# Patient Record
Sex: FEMALE | Race: WHITE | Employment: UNEMPLOYED | URBAN - METROPOLITAN AREA
[De-identification: names, ages, dates, MRNs, and addresses within clinical notes are randomized per-mention and may not be internally consistent; named-entity substitution may affect disease eponyms.]

---

## 2021-05-16 ENCOUNTER — APPOINTMENT (OUTPATIENT)
Dept: GENERAL RADIOLOGY | Age: 56
End: 2021-05-16
Payer: MEDICAID

## 2021-05-16 ENCOUNTER — APPOINTMENT (OUTPATIENT)
Dept: CT IMAGING | Age: 56
End: 2021-05-16
Payer: MEDICAID

## 2021-05-16 ENCOUNTER — HOSPITAL ENCOUNTER (EMERGENCY)
Age: 56
Discharge: HOME | End: 2021-05-16
Payer: MEDICAID

## 2021-05-16 VITALS
HEIGHT: 64 IN | BODY MASS INDEX: 27.31 KG/M2 | TEMPERATURE: 98.6 F | WEIGHT: 160 LBS | DIASTOLIC BLOOD PRESSURE: 74 MMHG | SYSTOLIC BLOOD PRESSURE: 99 MMHG | RESPIRATION RATE: 14 BRPM | HEART RATE: 98 BPM | OXYGEN SATURATION: 97 %

## 2021-05-16 DIAGNOSIS — G40.919 BREAKTHROUGH SEIZURE (HCC): Primary | ICD-10-CM

## 2021-05-16 LAB
ALBUMIN SERPL-MCNC: 4.5 GM/DL (ref 3.4–5)
ALCOHOL SCREEN SERUM: <0.01 %WT/VOL
ALP BLD-CCNC: 110 IU/L (ref 40–129)
ALT SERPL-CCNC: 12 U/L (ref 10–40)
AMPHETAMINES: NEGATIVE
ANION GAP SERPL CALCULATED.3IONS-SCNC: 9 MMOL/L (ref 4–16)
AST SERPL-CCNC: 16 IU/L (ref 15–37)
BACTERIA: ABNORMAL /HPF
BARBITURATE SCREEN URINE: NEGATIVE
BASOPHILS ABSOLUTE: 0 K/CU MM
BASOPHILS RELATIVE PERCENT: 0.3 % (ref 0–1)
BENZODIAZEPINE SCREEN, URINE: NEGATIVE
BILIRUB SERPL-MCNC: 0.4 MG/DL (ref 0–1)
BILIRUBIN URINE: NEGATIVE MG/DL
BLOOD, URINE: NEGATIVE
BUN BLDV-MCNC: 9 MG/DL (ref 6–23)
CALCIUM SERPL-MCNC: 10.1 MG/DL (ref 8.3–10.6)
CANNABINOID SCREEN URINE: NEGATIVE
CHLORIDE BLD-SCNC: 97 MMOL/L (ref 99–110)
CHP ED QC CHECK: YES
CLARITY: CLEAR
CO2: 29 MMOL/L (ref 21–32)
COCAINE METABOLITE: NEGATIVE
COLOR: YELLOW
CREAT SERPL-MCNC: 0.6 MG/DL (ref 0.6–1.1)
DIFFERENTIAL TYPE: ABNORMAL
EKG ATRIAL RATE: 126 BPM
EKG DIAGNOSIS: NORMAL
EKG P AXIS: 41 DEGREES
EKG P-R INTERVAL: 144 MS
EKG Q-T INTERVAL: 314 MS
EKG QRS DURATION: 76 MS
EKG QTC CALCULATION (BAZETT): 454 MS
EKG R AXIS: 21 DEGREES
EKG T AXIS: 59 DEGREES
EKG VENTRICULAR RATE: 126 BPM
EOSINOPHILS ABSOLUTE: 0.2 K/CU MM
EOSINOPHILS RELATIVE PERCENT: 2.3 % (ref 0–3)
GFR AFRICAN AMERICAN: >60 ML/MIN/1.73M2
GFR NON-AFRICAN AMERICAN: >60 ML/MIN/1.73M2
GLUCOSE BLD-MCNC: 102 MG/DL
GLUCOSE BLD-MCNC: 102 MG/DL (ref 70–99)
GLUCOSE, URINE: NEGATIVE MG/DL
HCT VFR BLD CALC: 41.2 % (ref 37–47)
HEMOGLOBIN: 13.4 GM/DL (ref 12.5–16)
IMMATURE NEUTROPHIL %: 0.2 % (ref 0–0.43)
KETONES, URINE: NEGATIVE MG/DL
LACTATE: 2.2 MMOL/L (ref 0.4–2)
LEUKOCYTE ESTERASE, URINE: ABNORMAL
LYMPHOCYTES ABSOLUTE: 1.6 K/CU MM
LYMPHOCYTES RELATIVE PERCENT: 17.7 % (ref 24–44)
MCH RBC QN AUTO: 27.7 PG (ref 27–31)
MCHC RBC AUTO-ENTMCNC: 32.5 % (ref 32–36)
MCV RBC AUTO: 85.1 FL (ref 78–100)
MONOCYTES ABSOLUTE: 0.4 K/CU MM
MONOCYTES RELATIVE PERCENT: 4.9 % (ref 0–4)
MUCUS: ABNORMAL HPF
NITRITE URINE, QUANTITATIVE: NEGATIVE
NUCLEATED RBC %: 0 %
OPIATES, URINE: NEGATIVE
OXYCODONE: ABNORMAL
PDW BLD-RTO: 13.1 % (ref 11.7–14.9)
PH, URINE: 7 (ref 5–8)
PHENCYCLIDINE, URINE: NEGATIVE
PLATELET # BLD: 213 K/CU MM (ref 140–440)
PMV BLD AUTO: 9.3 FL (ref 7.5–11.1)
POTASSIUM SERPL-SCNC: 4.1 MMOL/L (ref 3.5–5.1)
PROTEIN UA: NEGATIVE MG/DL
RBC # BLD: 4.84 M/CU MM (ref 4.2–5.4)
RBC URINE: 1 /HPF (ref 0–6)
SEGMENTED NEUTROPHILS ABSOLUTE COUNT: 6.7 K/CU MM
SEGMENTED NEUTROPHILS RELATIVE PERCENT: 74.6 % (ref 36–66)
SODIUM BLD-SCNC: 135 MMOL/L (ref 135–145)
SPECIFIC GRAVITY UA: 1.01 (ref 1–1.03)
SQUAMOUS EPITHELIAL: 1 /HPF
TOTAL IMMATURE NEUTOROPHIL: 0.02 K/CU MM
TOTAL NUCLEATED RBC: 0 K/CU MM
TOTAL PROTEIN: 7.4 GM/DL (ref 6.4–8.2)
TRANSITIONAL EPITHELIAL: <1 /HPF
TRICHOMONAS: ABNORMAL /HPF
TROPONIN T: <0.01 NG/ML
UROBILINOGEN, URINE: NEGATIVE MG/DL (ref 0.2–1)
WBC # BLD: 9 K/CU MM (ref 4–10.5)
WBC UA: 4 /HPF (ref 0–5)

## 2021-05-16 PROCEDURE — 80053 COMPREHEN METABOLIC PANEL: CPT

## 2021-05-16 PROCEDURE — 83605 ASSAY OF LACTIC ACID: CPT

## 2021-05-16 PROCEDURE — 93005 ELECTROCARDIOGRAM TRACING: CPT | Performed by: PHYSICIAN ASSISTANT

## 2021-05-16 PROCEDURE — 2580000003 HC RX 258: Performed by: EMERGENCY MEDICINE

## 2021-05-16 PROCEDURE — 96375 TX/PRO/DX INJ NEW DRUG ADDON: CPT

## 2021-05-16 PROCEDURE — 6360000002 HC RX W HCPCS: Performed by: EMERGENCY MEDICINE

## 2021-05-16 PROCEDURE — 84484 ASSAY OF TROPONIN QUANT: CPT

## 2021-05-16 PROCEDURE — 80307 DRUG TEST PRSMV CHEM ANLYZR: CPT

## 2021-05-16 PROCEDURE — 70450 CT HEAD/BRAIN W/O DYE: CPT

## 2021-05-16 PROCEDURE — 71045 X-RAY EXAM CHEST 1 VIEW: CPT

## 2021-05-16 PROCEDURE — G0480 DRUG TEST DEF 1-7 CLASSES: HCPCS

## 2021-05-16 PROCEDURE — 6370000000 HC RX 637 (ALT 250 FOR IP): Performed by: PHYSICIAN ASSISTANT

## 2021-05-16 PROCEDURE — 93010 ELECTROCARDIOGRAM REPORT: CPT | Performed by: INTERNAL MEDICINE

## 2021-05-16 PROCEDURE — 85025 COMPLETE CBC W/AUTO DIFF WBC: CPT

## 2021-05-16 PROCEDURE — 96361 HYDRATE IV INFUSION ADD-ON: CPT

## 2021-05-16 PROCEDURE — 6360000002 HC RX W HCPCS: Performed by: PHYSICIAN ASSISTANT

## 2021-05-16 PROCEDURE — 96365 THER/PROPH/DIAG IV INF INIT: CPT

## 2021-05-16 PROCEDURE — 81001 URINALYSIS AUTO W/SCOPE: CPT

## 2021-05-16 PROCEDURE — 99284 EMERGENCY DEPT VISIT MOD MDM: CPT

## 2021-05-16 PROCEDURE — 2580000003 HC RX 258: Performed by: PHYSICIAN ASSISTANT

## 2021-05-16 RX ORDER — 0.9 % SODIUM CHLORIDE 0.9 %
500 INTRAVENOUS SOLUTION INTRAVENOUS ONCE
Status: COMPLETED | OUTPATIENT
Start: 2021-05-16 | End: 2021-05-16

## 2021-05-16 RX ORDER — LORAZEPAM 2 MG/ML
0.5 INJECTION INTRAMUSCULAR ONCE
Status: COMPLETED | OUTPATIENT
Start: 2021-05-16 | End: 2021-05-16

## 2021-05-16 RX ORDER — 0.9 % SODIUM CHLORIDE 0.9 %
1000 INTRAVENOUS SOLUTION INTRAVENOUS ONCE
Status: DISCONTINUED | OUTPATIENT
Start: 2021-05-16 | End: 2021-05-16 | Stop reason: HOSPADM

## 2021-05-16 RX ORDER — ACETAMINOPHEN 500 MG
1000 TABLET ORAL ONCE
Status: COMPLETED | OUTPATIENT
Start: 2021-05-16 | End: 2021-05-16

## 2021-05-16 RX ADMIN — LORAZEPAM 0.5 MG: 2 INJECTION INTRAMUSCULAR; INTRAVENOUS at 11:04

## 2021-05-16 RX ADMIN — LEVETIRACETAM 1500 MG: 100 INJECTION, SOLUTION INTRAVENOUS at 11:04

## 2021-05-16 RX ADMIN — ACETAMINOPHEN 1000 MG: 500 TABLET ORAL at 11:21

## 2021-05-16 RX ADMIN — SODIUM CHLORIDE 500 ML: 9 INJECTION, SOLUTION INTRAVENOUS at 13:11

## 2021-05-16 ASSESSMENT — PAIN SCALES - GENERAL: PAINLEVEL_OUTOF10: 8

## 2021-05-16 NOTE — ED NOTES
Bed: 02TR-02  Expected date: 5/16/21  Expected time: 10:18 AM  Means of arrival: Triny Cardoso Twviolette  Comments:  50 F post ictal     Etian Wilburn RN  05/16/21 1025

## 2021-05-16 NOTE — ED NOTES
Discharge instructions reviewed with patient and family. follow up was discussed.  Patient and family denies further questions and verbalizes understanding     Kanun Seen, RN  05/16/21 9240
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
## 2021-05-22 ENCOUNTER — APPOINTMENT (OUTPATIENT)
Dept: CT IMAGING | Age: 56
DRG: 069 | End: 2021-05-22
Payer: MEDICAID

## 2021-05-22 ENCOUNTER — APPOINTMENT (OUTPATIENT)
Dept: GENERAL RADIOLOGY | Age: 56
DRG: 069 | End: 2021-05-22
Payer: MEDICAID

## 2021-05-22 ENCOUNTER — HOSPITAL ENCOUNTER (INPATIENT)
Age: 56
LOS: 2 days | Discharge: HOME | DRG: 069 | End: 2021-05-24
Attending: EMERGENCY MEDICINE | Admitting: INTERNAL MEDICINE
Payer: MEDICAID

## 2021-05-22 DIAGNOSIS — I63.9 CEREBROVASCULAR ACCIDENT (CVA), UNSPECIFIED MECHANISM (HCC): Primary | ICD-10-CM

## 2021-05-22 PROBLEM — R29.90 STROKE-LIKE SYMPTOMS: Status: ACTIVE | Noted: 2021-05-22

## 2021-05-22 LAB
ALBUMIN SERPL-MCNC: 4.2 GM/DL (ref 3.4–5)
ALCOHOL SCREEN SERUM: <0.01 %WT/VOL
ALP BLD-CCNC: 96 IU/L (ref 40–129)
ALT SERPL-CCNC: 10 U/L (ref 10–40)
ANION GAP SERPL CALCULATED.3IONS-SCNC: 9 MMOL/L (ref 4–16)
AST SERPL-CCNC: 13 IU/L (ref 15–37)
BASOPHILS ABSOLUTE: 0 K/CU MM
BASOPHILS RELATIVE PERCENT: 0.4 % (ref 0–1)
BILIRUB SERPL-MCNC: 0.3 MG/DL (ref 0–1)
BUN BLDV-MCNC: 9 MG/DL (ref 6–23)
CALCIUM SERPL-MCNC: 8.9 MG/DL (ref 8.3–10.6)
CHLORIDE BLD-SCNC: 97 MMOL/L (ref 99–110)
CO2: 27 MMOL/L (ref 21–32)
CREAT SERPL-MCNC: 0.7 MG/DL (ref 0.6–1.1)
DIFFERENTIAL TYPE: ABNORMAL
EOSINOPHILS ABSOLUTE: 0.1 K/CU MM
EOSINOPHILS RELATIVE PERCENT: 0.6 % (ref 0–3)
GFR AFRICAN AMERICAN: >60 ML/MIN/1.73M2
GFR NON-AFRICAN AMERICAN: >60 ML/MIN/1.73M2
GLUCOSE BLD-MCNC: 115 MG/DL (ref 70–99)
GLUCOSE BLD-MCNC: 116 MG/DL (ref 70–99)
HCT VFR BLD CALC: 34.4 % (ref 37–47)
HEMOGLOBIN: 11.6 GM/DL (ref 12.5–16)
IMMATURE NEUTROPHIL %: 0.4 % (ref 0–0.43)
INR BLD: 1.07 INDEX
LYMPHOCYTES ABSOLUTE: 0.9 K/CU MM
LYMPHOCYTES RELATIVE PERCENT: 12 % (ref 24–44)
MCH RBC QN AUTO: 28.6 PG (ref 27–31)
MCHC RBC AUTO-ENTMCNC: 33.7 % (ref 32–36)
MCV RBC AUTO: 84.9 FL (ref 78–100)
MONOCYTES ABSOLUTE: 0.4 K/CU MM
MONOCYTES RELATIVE PERCENT: 5.1 % (ref 0–4)
NUCLEATED RBC %: 0 %
PDW BLD-RTO: 13.4 % (ref 11.7–14.9)
PLATELET # BLD: 218 K/CU MM (ref 140–440)
PMV BLD AUTO: 9 FL (ref 7.5–11.1)
POTASSIUM SERPL-SCNC: 4.1 MMOL/L (ref 3.5–5.1)
PROTHROMBIN TIME: 12.9 SECONDS (ref 11.7–14.5)
RBC # BLD: 4.05 M/CU MM (ref 4.2–5.4)
SEGMENTED NEUTROPHILS ABSOLUTE COUNT: 6.3 K/CU MM
SEGMENTED NEUTROPHILS RELATIVE PERCENT: 81.5 % (ref 36–66)
SODIUM BLD-SCNC: 133 MMOL/L (ref 135–145)
TOTAL IMMATURE NEUTOROPHIL: 0.03 K/CU MM
TOTAL NUCLEATED RBC: 0 K/CU MM
TOTAL PROTEIN: 6.4 GM/DL (ref 6.4–8.2)
TROPONIN T: <0.01 NG/ML
WBC # BLD: 7.7 K/CU MM (ref 4–10.5)

## 2021-05-22 PROCEDURE — 70496 CT ANGIOGRAPHY HEAD: CPT

## 2021-05-22 PROCEDURE — 71045 X-RAY EXAM CHEST 1 VIEW: CPT

## 2021-05-22 PROCEDURE — 93005 ELECTROCARDIOGRAM TRACING: CPT | Performed by: EMERGENCY MEDICINE

## 2021-05-22 PROCEDURE — 70450 CT HEAD/BRAIN W/O DYE: CPT

## 2021-05-22 PROCEDURE — 82962 GLUCOSE BLOOD TEST: CPT

## 2021-05-22 PROCEDURE — 85610 PROTHROMBIN TIME: CPT

## 2021-05-22 PROCEDURE — 2140000000 HC CCU INTERMEDIATE R&B

## 2021-05-22 PROCEDURE — 84484 ASSAY OF TROPONIN QUANT: CPT

## 2021-05-22 PROCEDURE — G0480 DRUG TEST DEF 1-7 CLASSES: HCPCS

## 2021-05-22 PROCEDURE — 80053 COMPREHEN METABOLIC PANEL: CPT

## 2021-05-22 PROCEDURE — 6360000004 HC RX CONTRAST MEDICATION: Performed by: EMERGENCY MEDICINE

## 2021-05-22 PROCEDURE — 6370000000 HC RX 637 (ALT 250 FOR IP): Performed by: NURSE PRACTITIONER

## 2021-05-22 PROCEDURE — 85025 COMPLETE CBC W/AUTO DIFF WBC: CPT

## 2021-05-22 PROCEDURE — 99284 EMERGENCY DEPT VISIT MOD MDM: CPT

## 2021-05-22 PROCEDURE — 70498 CT ANGIOGRAPHY NECK: CPT

## 2021-05-22 PROCEDURE — 2580000003 HC RX 258: Performed by: NURSE PRACTITIONER

## 2021-05-22 PROCEDURE — 36415 COLL VENOUS BLD VENIPUNCTURE: CPT

## 2021-05-22 RX ORDER — ESOMEPRAZOLE MAGNESIUM 40 MG/1
40 FOR SUSPENSION ORAL DAILY
Status: DISCONTINUED | OUTPATIENT
Start: 2021-05-23 | End: 2021-05-22 | Stop reason: CLARIF

## 2021-05-22 RX ORDER — SODIUM CHLORIDE 0.9 % (FLUSH) 0.9 %
5-40 SYRINGE (ML) INJECTION EVERY 12 HOURS SCHEDULED
Status: DISCONTINUED | OUTPATIENT
Start: 2021-05-22 | End: 2021-05-24 | Stop reason: HOSPADM

## 2021-05-22 RX ORDER — CHOLECALCIFEROL (VITAMIN D3) 125 MCG
10 CAPSULE ORAL NIGHTLY PRN
Status: DISCONTINUED | OUTPATIENT
Start: 2021-05-22 | End: 2021-05-24 | Stop reason: HOSPADM

## 2021-05-22 RX ORDER — LEVETIRACETAM 500 MG/1
1500 TABLET ORAL 2 TIMES DAILY
Status: DISCONTINUED | OUTPATIENT
Start: 2021-05-22 | End: 2021-05-24 | Stop reason: HOSPADM

## 2021-05-22 RX ORDER — FLUOXETINE HYDROCHLORIDE 20 MG/1
20 CAPSULE ORAL DAILY
Status: DISCONTINUED | OUTPATIENT
Start: 2021-05-23 | End: 2021-05-24 | Stop reason: HOSPADM

## 2021-05-22 RX ORDER — POLYETHYLENE GLYCOL 3350 17 G/17G
17 POWDER, FOR SOLUTION ORAL DAILY PRN
Status: DISCONTINUED | OUTPATIENT
Start: 2021-05-22 | End: 2021-05-24 | Stop reason: HOSPADM

## 2021-05-22 RX ORDER — LEVETIRACETAM 750 MG/1
1500 TABLET ORAL 2 TIMES DAILY
COMMUNITY

## 2021-05-22 RX ORDER — ONDANSETRON 2 MG/ML
4 INJECTION INTRAMUSCULAR; INTRAVENOUS EVERY 6 HOURS PRN
Status: DISCONTINUED | OUTPATIENT
Start: 2021-05-22 | End: 2021-05-24 | Stop reason: HOSPADM

## 2021-05-22 RX ORDER — AMANTADINE HYDROCHLORIDE 100 MG/1
100 TABLET ORAL 2 TIMES DAILY
COMMUNITY

## 2021-05-22 RX ORDER — AMITRIPTYLINE HYDROCHLORIDE 25 MG/1
25 TABLET, FILM COATED ORAL NIGHTLY
COMMUNITY

## 2021-05-22 RX ORDER — ESOMEPRAZOLE MAGNESIUM 40 MG/1
40 FOR SUSPENSION ORAL DAILY
COMMUNITY

## 2021-05-22 RX ORDER — ROSUVASTATIN CALCIUM 40 MG/1
40 TABLET, COATED ORAL NIGHTLY
Status: DISCONTINUED | OUTPATIENT
Start: 2021-05-22 | End: 2021-05-24 | Stop reason: HOSPADM

## 2021-05-22 RX ORDER — LANOLIN ALCOHOL/MO/W.PET/CERES
10 CREAM (GRAM) TOPICAL NIGHTLY PRN
COMMUNITY

## 2021-05-22 RX ORDER — TAMSULOSIN HYDROCHLORIDE 0.4 MG/1
0.4 CAPSULE ORAL 2 TIMES DAILY
COMMUNITY

## 2021-05-22 RX ORDER — PROMETHAZINE HYDROCHLORIDE 25 MG/1
12.5 TABLET ORAL EVERY 6 HOURS PRN
Status: DISCONTINUED | OUTPATIENT
Start: 2021-05-22 | End: 2021-05-24 | Stop reason: HOSPADM

## 2021-05-22 RX ORDER — DICLOFENAC SODIUM 75 MG/1
75 TABLET, DELAYED RELEASE ORAL 2 TIMES DAILY
Status: ON HOLD | COMMUNITY
End: 2021-05-24 | Stop reason: HOSPADM

## 2021-05-22 RX ORDER — ASPIRIN 81 MG/1
81 TABLET ORAL DAILY
Status: CANCELLED | OUTPATIENT
Start: 2021-05-22

## 2021-05-22 RX ORDER — PANTOPRAZOLE SODIUM 40 MG/1
40 TABLET, DELAYED RELEASE ORAL
Status: DISCONTINUED | OUTPATIENT
Start: 2021-05-23 | End: 2021-05-24 | Stop reason: HOSPADM

## 2021-05-22 RX ORDER — SODIUM CHLORIDE 9 MG/ML
25 INJECTION, SOLUTION INTRAVENOUS PRN
Status: DISCONTINUED | OUTPATIENT
Start: 2021-05-22 | End: 2021-05-24 | Stop reason: HOSPADM

## 2021-05-22 RX ORDER — ASPIRIN 600 MG/1
300 SUPPOSITORY RECTAL DAILY
Status: CANCELLED | OUTPATIENT
Start: 2021-05-22

## 2021-05-22 RX ORDER — AMITRIPTYLINE HYDROCHLORIDE 25 MG/1
25 TABLET, FILM COATED ORAL NIGHTLY
Status: DISCONTINUED | OUTPATIENT
Start: 2021-05-22 | End: 2021-05-24 | Stop reason: HOSPADM

## 2021-05-22 RX ORDER — SODIUM CHLORIDE 0.9 % (FLUSH) 0.9 %
5-40 SYRINGE (ML) INJECTION PRN
Status: DISCONTINUED | OUTPATIENT
Start: 2021-05-22 | End: 2021-05-24 | Stop reason: HOSPADM

## 2021-05-22 RX ORDER — FLUOXETINE HYDROCHLORIDE 20 MG/1
20 CAPSULE ORAL DAILY
COMMUNITY

## 2021-05-22 RX ORDER — DONEPEZIL HYDROCHLORIDE 10 MG/1
10 TABLET, FILM COATED ORAL NIGHTLY
Status: DISCONTINUED | OUTPATIENT
Start: 2021-05-22 | End: 2021-05-24 | Stop reason: HOSPADM

## 2021-05-22 RX ORDER — TAMSULOSIN HYDROCHLORIDE 0.4 MG/1
0.4 CAPSULE ORAL 2 TIMES DAILY
Status: DISCONTINUED | OUTPATIENT
Start: 2021-05-22 | End: 2021-05-24 | Stop reason: HOSPADM

## 2021-05-22 RX ORDER — AMANTADINE HYDROCHLORIDE 100 MG/1
100 CAPSULE, GELATIN COATED ORAL 2 TIMES DAILY
Status: DISCONTINUED | OUTPATIENT
Start: 2021-05-22 | End: 2021-05-24 | Stop reason: HOSPADM

## 2021-05-22 RX ORDER — 0.9 % SODIUM CHLORIDE 0.9 %
10 VIAL (ML) INJECTION
Status: COMPLETED | OUTPATIENT
Start: 2021-05-22 | End: 2021-05-22

## 2021-05-22 RX ORDER — DONEPEZIL HYDROCHLORIDE 10 MG/1
10 TABLET, FILM COATED ORAL NIGHTLY
COMMUNITY

## 2021-05-22 RX ADMIN — AMANTADINE HYDROCHLORIDE 100 MG: 100 CAPSULE, LIQUID FILLED ORAL at 22:55

## 2021-05-22 RX ADMIN — IOPAMIDOL 75 ML: 755 INJECTION, SOLUTION INTRAVENOUS at 14:54

## 2021-05-22 RX ADMIN — ROSUVASTATIN 40 MG: 40 TABLET, FILM COATED ORAL at 22:55

## 2021-05-22 RX ADMIN — Medication 10 MG: at 22:55

## 2021-05-22 RX ADMIN — DONEPEZIL HYDROCHLORIDE 10 MG: 10 TABLET, FILM COATED ORAL at 22:56

## 2021-05-22 RX ADMIN — TAMSULOSIN HYDROCHLORIDE 0.4 MG: 0.4 CAPSULE ORAL at 22:55

## 2021-05-22 RX ADMIN — AMITRIPTYLINE HYDROCHLORIDE 25 MG: 25 TABLET, FILM COATED ORAL at 22:56

## 2021-05-22 RX ADMIN — Medication 10 ML: at 14:55

## 2021-05-22 RX ADMIN — LEVETIRACETAM 1500 MG: 500 TABLET, FILM COATED ORAL at 22:55

## 2021-05-22 RX ADMIN — SODIUM CHLORIDE, PRESERVATIVE FREE 10 ML: 5 INJECTION INTRAVENOUS at 22:56

## 2021-05-22 NOTE — ED PROVIDER NOTES
bowel sounds. Soft. Nontender. Non distended. Neurological:      Mental Status Exam:   Alert and oriented times three, follows commands, patient speech and language are not intact she is able to say 1 or 2 words and that everything else that comes out is gibberish and nonsensical    Cranial [id number] Intact.     Cranial nerve II  Visual acuity: normal  Cranial nerve III  Pupils: equal, round, reactive to light  Cranial nerves III, IV, VI  Extraocular Movements: intact  Cranial nerve V  Facial sensation: intact  Cranial nerve VII  Facial strength: intact  Cranial nerve VIII  Hearing: intact  Cranial nerve IX  Palate: intact  Cranial nerve XI  Shoulder shrug: intact  Cranial nerve XII  Tongue movement: normal    Motor:   Drift: absent  Motor exam is symmetrical 5 out of 5 all extremities bilaterally  Tone: normal  Abnormal Movements: Absent    Sensory:  Light Touch  Right Upper Extremity: normal  Left Upper Extremity: normal  Right Lower Extremity: normal  Left Lower Extremity: normal      I have reviewed and interpreted all of the currently available lab results from this visit (if applicable):  Results for orders placed or performed during the hospital encounter of 05/22/21   CBC Auto Differential   Result Value Ref Range    WBC 7.7 4.0 - 10.5 K/CU MM    RBC 4.05 (L) 4.2 - 5.4 M/CU MM    Hemoglobin 11.6 (L) 12.5 - 16.0 GM/DL    Hematocrit 34.4 (L) 37 - 47 %    MCV 84.9 78 - 100 FL    MCH 28.6 27 - 31 PG    MCHC 33.7 32.0 - 36.0 %    RDW 13.4 11.7 - 14.9 %    Platelets 030 302 - 461 K/CU MM    MPV 9.0 7.5 - 11.1 FL    Differential Type AUTOMATED DIFFERENTIAL     Segs Relative 81.5 (H) 36 - 66 %    Lymphocytes % 12.0 (L) 24 - 44 %    Monocytes % 5.1 (H) 0 - 4 %    Eosinophils % 0.6 0 - 3 %    Basophils % 0.4 0 - 1 %    Segs Absolute 6.3 K/CU MM    Lymphocytes Absolute 0.9 K/CU MM    Monocytes Absolute 0.4 K/CU MM    Eosinophils Absolute 0.1 K/CU MM    Basophils Absolute 0.0 K/CU MM    Nucleated RBC % 0.0 % Total Nucleated RBC 0.0 K/CU MM    Total Immature Neutrophil 0.03 K/CU MM    Immature Neutrophil % 0.4 0 - 0.43 %   Comprehensive Metabolic Panel w/ Reflex to MG   Result Value Ref Range    Sodium 133 (L) 135 - 145 MMOL/L    Potassium 4.1 3.5 - 5.1 MMOL/L    Chloride 97 (L) 99 - 110 mMol/L    CO2 27 21 - 32 MMOL/L    BUN 9 6 - 23 MG/DL    CREATININE 0.7 0.6 - 1.1 MG/DL    Glucose 115 (H) 70 - 99 MG/DL    Calcium 8.9 8.3 - 10.6 MG/DL    Albumin 4.2 3.4 - 5.0 GM/DL    Total Protein 6.4 6.4 - 8.2 GM/DL    Total Bilirubin 0.3 0.0 - 1.0 MG/DL    ALT 10 10 - 40 U/L    AST 13 (L) 15 - 37 IU/L    Alkaline Phosphatase 96 40 - 129 IU/L    GFR Non-African American >60 >60 mL/min/1.73m2    GFR African American >60 >60 mL/min/1.73m2    Anion Gap 9 4 - 16   Troponin   Result Value Ref Range    Troponin T <0.010 <0.01 NG/ML   Protime-INR   Result Value Ref Range    Protime 12.9 11.7 - 14.5 SECONDS    INR 1.07 INDEX   POCT Glucose   Result Value Ref Range    POC Glucose 116 (H) 70 - 99 MG/DL   EKG 12 Lead   Result Value Ref Range    Ventricular Rate 109 BPM    Atrial Rate 109 BPM    P-R Interval 144 ms    QRS Duration 66 ms    Q-T Interval 340 ms    QTc Calculation (Bazett) 457 ms    P Axis 48 degrees    R Axis 35 degrees    T Axis 49 degrees    Diagnosis       Sinus tachycardia  Low voltage QRS  Borderline ECG  When compared with ECG of 16-MAY-2021 11:05,  No significant change was found        Radiographs (if obtained):  Radiologist's Report Reviewed:  No results found.     EKG (if obtained): (All EKG's are interpreted by myself in the absence of a cardiologist)  EKG shows sinus rhythm rate of 109 normal DE and QRS intervals no ST elevation no old EKGs available for comparison  MDM:  Patient presenting for speech difficulty, stroke alert called, patient taken to CT, received a CTA, no large vessel occlusion, CT does show a subacute or old subdural hematoma, patient's dysarthria is slowly improving, lab results are nonemergent,

## 2021-05-22 NOTE — Clinical Note
Patient Class: Inpatient [101]   REQUIRED: Diagnosis: Stroke-like symptoms [837888]   Estimated Length of Stay: Estimated stay of more than 2 midnights   Admitting Provider: Leann Tadeo [[id number]]   Preferred Department: If not neuro floor, 4 N if available   Telemetry/Cardiac Monitoring Required?: Yes

## 2021-05-22 NOTE — ED NOTES
Bed: ED-32  Expected date:   Expected time:   Means of arrival:   Comments:  EMS- 54's F history of TBI     Michelle Maldonado  05/22/21 9763

## 2021-05-22 NOTE — H&P
History and Physical      Name:  Fern Murphy /Age/Sex: 1965  (50 y.o. female)   MRN & CSN:  [id number] & [id number] Admission Date/Time: 2021  2:41 PM   Location:  [place] [address] PCP: No primary care provider on file. Hospital Day: 1    Discussed patient and POC with Dr. Lee Sanchez and Plan:     Fern Murphy is a 50 y.o.  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): not a dissection but fenestration; present since at least   ED to consult vascular, Dr Marisela Archer re V2 dissection  Neurosurgery c/s by ED, Dr Sarah Mackenzie- will follow  CTA head/neck without acute process  Neuro exam non-focal  Neuro checks Q 4  Check Brain MRI without contrast  Consult to Neurology by ED  Checks A1c/Lipid  Holding ASA/NSAIDS given hx TBI, recent surgery, hx parenchymal bleeding-defer to NS on starting these      Chronic  - TBI w/ intracranial bleeding hx- 2020;  Tx at [address]; had bone flap replaced ~ 10 days ago  - Seizure hx- onset prior to TBI; continue keppra 1500 bid  - Headache hx- onset prior to TBI  - Depression- cont prozac  - GERD- cont ppi      Discussed patient with ED physician    Diet NPO until swallow study; then AAT to GEN   DVT Prophylaxis [] Lovenox, []  Heparin, [x] SCDs, [] Ambulation   GI Prophylaxis [] PPI,  [] H2 Blocker,  [] Carafate,  [] Diet/Tube Feeds   Code Status Full    Disposition Patient requires continued admission due to stroke w/u   MDM [] Low, [] Moderate,[x]  High  Patient's risk as above due to      [] One or more chronic illnesses with exacerbation progression      [x] Two or more stable chronic illnesses      [x] Undiagnosed new problem with uncertain conjunctivitis. HENT Mucous membranes are moist. Oral pharynx without exudates, no evidence of thrush. NECK Supple, no apparent thyromegaly or masses. RESP Clear to auscultation, no wheezes, rales or rhonchi. Symmetric chest movement while on room air. CARDIO/VASC S1/S2 auscultated. Regular rate without appreciable murmurs, rubs, or gallops. No JVD or carotid bruits. Peripheral pulses equal bilaterally and palpable. No peripheral edema. GI Abdomen is soft without significant tenderness, masses, or guarding. Bowel sounds are normoactive. Rectal exam deferred.  No costovertebral angle tenderness. Begum catheter is not present. HEME/LYMPH No palpable cervical lymphadenopathy and no hepatosplenomegaly. No petechiae or ecchymoses. MSK No gross joint deformities. Equal strength bilaterally to upper and lower extremities. 5/5 tested at delt/triceps/biceps/grasps/finger intrinsics/hip/knee/df/pf  SKIN Normal coloration, warm, dry. NEURO Cranial nerves appear grossly intact, normal speech, no lateralizing weakness. Sensation intact and equal bilaterally  PSYCH Awake, alert, oriented x 3    Past Medical History:        Past Medical History:   Diagnosis Date    Seizures ([facility] [address]        PSHX:  has a past surgical history that includes craniotomy. Allergies:    Allergies   Allergen Reactions    Codeine        FAM HX:denies known family history     Soc HX:   Social History     Socioeconomic History    Marital status: Single     Spouse name: Not on file    Number of children: Not on file    Years of education: Not on file    Highest education level: Not on file   Occupational History    Not on file   Tobacco Use    Smoking status: Never Smoker    Smokeless tobacco: Never Used   Substance and Sexual Activity    Alcohol use: Not Currently    Drug use: Never    Sexual activity: Not Currently   Other Topics Concern    Not on file   Social History Narrative    Not on file     Social Determinants of Health Financial Resource Strain:     Difficulty of Paying Living Expenses:    Food Insecurity:     Worried About Running Out of Food in the Last Year:     [address] in the Last Year:    Transportation Needs:     Lack of Transportation (Medical):  Lack of Transportation (Non-Medical):    Physical Activity:     Days of Exercise per Week:     Minutes of Exercise per Session:    Stress:     Feeling of Stress :    Social Connections:     Frequency of Communication with Friends and Family:     Frequency of Social Gatherings with Friends and Family:     Attends Hoah[employer] Services:     Active Member of Clubs or Organizations:     Attends Club or Organization Meetings:     Marital Status:    Intimate Partner Violence:     Fear of Current or Ex-Partner:     Emotionally Abused:     Physically Abused:     Sexually Abused:        Medications:     Medications:      Amantadine (SYMMETREL) 100 MG TABS tablet Take 100 mg by mouth 2 times daily Ileene Danmartin, APRN - NP Reordered   Ordered as: Amantadine (SYMMETREL) tablet 100 mg - 100 mg, Oral, 2 TIMES DAILY, First dose on Sat 5/22/21 at 2100   levETIRAcetam (KEPPRA) 750 MG tablet Take 1,500 mg by mouth 2 times daily Ileene Dance, APRN - NP Reordered   Ordered as: levETIRAcetam (KEPPRA) tablet 1,500 mg - 1,500 mg, Oral, 2 TIMES DAILY, First dose on Sat 5/22/21 at 2100 Do not crush or chew.     donepezil (ARICEPT) 10 MG tablet Take 10 mg by mouth nightly Ileene Dance, APRN - NP Reordered   Ordered as: donepezil (ARICEPT) tablet 10 mg - 10 mg, Oral, NIGHTLY, First dose on Sat 5/22/21 at 2100   FLUoxetine (PROZAC) 20 MG capsule Take 20 mg by mouth daily Ileene Dance, APRN - NP Reordered   Ordered as: FLUoxetine (PROZAC) capsule 20 mg - 20 mg, Oral, DAILY, First dose on Sun 5/23/21 at 0900   melatonin 3 MG TABS tablet Take 10 mg by mouth nightly as needed Ileene Dance, APRN - NP Reordered   Ordered as: melatonin tablet 10 mg - 10 mg, Oral, NIGHTLY PRN, Sleep, Starting on Sat 5/22/21 at 2100   esomeprazole Magnesium (NEXIUM) 40 MG PACK Take 40 mg by mouth daily ALFONSO Davenport NP Reordered   Ordered as: esomeprazole Magnesium (NEXIUM) 40 MG PACK 40 mg - 40 mg, Oral, DAILY, First dose on Sun 5/23/21 at 0900 Do not crush or break. tamsulosin (FLOMAX) 0.4 MG capsule Take 0.4 mg by mouth 2 times daily ALFONSO Davenport - NP Reordered   Ordered as: tamsulosin (FLOMAX) capsule 0.4 mg - 0.4 mg, Oral, 2 TIMES DAILY, First dose on Sat 5/22/21 at 2100 Do not crush or break. amitriptyline (ELAVIL) 25 MG tablet Take 25 mg by mouth nightly ALFONSO Davenport - NP Reordered   Ordered as: amitriptyline (ELAVIL) tablet 25 mg - 25 mg, Oral, NIGHTLY, First dose on Sat 5/22/21 at 2100   diclofenac (VOLTAREN) 75 MG EC tablet Take 75 mg by mouth 2 times daily ALFONSO Davenport - NP Not Ordered     Data:   CTA HEAD W CONTRAST [[id number]] Collected: 05/22/21 1514      Order Status: Completed Updated: 05/22/21 1535     Narrative:       EXAMINATION:   CTA OF THE NECK; CTA OF THE HEAD WITH CONTRAST 5/22/2021 2:53 pm; 5/22/2021   2:52 pm:     TECHNIQUE:   CTA of the neck was performed with the administration of intravenous   contrast. Multiplanar reformatted images are provided for review.  MIP images   are provided for review. Stenosis of the internal carotid arteries measured   using NASCET criteria. Dose modulation, iterative reconstruction, and/or   weight based adjustment of the mA/kV was utilized to reduce the radiation   dose to as low as reasonably achievable.; CTA of the head/brain was performed   with the administration of intravenous contrast. Multiplanar reformatted   images are provided for review.  MIP images are provided for review. Dose   modulation, iterative reconstruction, and/or weight based adjustment of the   mA/kV was utilized to reduce the radiation dose to as low as reasonably   achievable. COMPARISON:   None. HISTORY:   ORDERING SYSTEM PROVIDED HISTORY: dysarthria   TECHNOLOGIST PROVIDED HISTORY:   Has a \"code stroke\" or \"stroke alert\" been called? ->Yes   Reason for exam:->dysarthria   Decision Support Exception - unselect if not a suspected or confirmed   emergency medical condition->Emergency Medical Condition (MA)   Reason for Exam: dysarthria   Acuity: Acute   Type of Exam: Initial   Additional signs and symptoms: poorhistorian   Relevant Medical/Surgical History: unable to answer questions; ORDERING   SYSTEM PROVIDED HISTORY: dysarthria   TECHNOLOGIST PROVIDED HISTORY:   Has a \"code stroke\" or \"stroke alert\" been called? ->Yes   Reason for exam:->dysarthria   Decision Support Exception - unselect if not a suspected or confirmed   emergency medical condition->Emergency Medical Condition (MA)   Reason for Exam: dysarthria   Acuity: Acute   Type of Exam: Initial   Additional signs and symptoms: poor historian   Relevant Medical/Surgical History: 75ml isovue 370     FINDINGS:     CTA NECK:     AORTIC ARCH/ARCH VESSELS: No dissection or arterial injury.  No significant   stenosis of the brachiocephalic or subclavian arteries. CAROTID ARTERIES: No dissection, arterial injury, or hemodynamically   significant stenosis by NASCET criteria. VERTEBRAL ARTERIES: No dissection, arterial injury, or significant stenosis   in the right vertebral artery.  There is short-segment dissection of the left   vertebral artery V2 segment spanning the C5 vertebral body level.  No   associated stenosis. SOFT TISSUES: The lung apices are clear.  No cervical or superior mediastinal   lymphadenopathy.  The larynx and pharynx are unremarkable.  No acute   abnormality of the salivary and thyroid glands. BONES: Moderate degenerative disease at C6-C7. CTA HEAD:     ANTERIOR CIRCULATION: No significant stenosis of the intracranial internal   carotid, anterior cerebral, or middle cerebral arteries. No aneurysm.    Anterior communicating artery is present. POSTERIOR CIRCULATION: No significant stenosis of the vertebral, basilar, or   posterior cerebral arteries. No aneurysm. OTHER: No dural venous sinus thrombosis on this non-dedicated study. BRAIN: No mass effect or midline shift. No extra-axial fluid collection. The   gray-white differentiation is maintained.      Impression:       1. No hemodynamically significant stenosis or branch occlusion in the   cervical or intracranial circulation. 2. Short segment left vertebral artery V2 segment dissection spanning the C5   vertebral body level.  No associated stenosis. Findings were discussed with Carey Youssef at 3:32 pm on 5/22/2021.      CTA NECK W CONTRAST [[id number]] Collected: 05/22/21 1514     Order Status: Completed Updated: 05/22/21 1535     Narrative:       EXAMINATION:   CTA OF THE NECK; CTA OF THE HEAD WITH CONTRAST 5/22/2021 2:53 pm; 5/22/2021   2:52 pm:     TECHNIQUE:   CTA of the neck was performed with the administration of intravenous   contrast. Multiplanar reformatted images are provided for review.  MIP images   are provided for review. Stenosis of the internal carotid arteries measured   using NASCET criteria. Dose modulation, iterative reconstruction, and/or   weight based adjustment of the mA/kV was utilized to reduce the radiation   dose to as low as reasonably achievable.; CTA of the head/brain was performed   with the administration of intravenous contrast. Multiplanar reformatted   images are provided for review.  MIP images are provided for review. Dose   modulation, iterative reconstruction, and/or weight based adjustment of the   mA/kV was utilized to reduce the radiation dose to as low as reasonably   achievable. COMPARISON:   None. HISTORY:   ORDERING SYSTEM PROVIDED HISTORY: dysarthria   TECHNOLOGIST PROVIDED HISTORY:   Has a \"code stroke\" or \"stroke alert\" been called? ->Yes   Reason for exam:->dysarthria   Decision Support Exception - unselect if not a suspected or confirmed   emergency medical condition->Emergency Medical Condition (MA)   Reason for Exam: dysarthria   Acuity: Acute   Type of Exam: Initial   Additional signs and symptoms: poorhistorian   Relevant Medical/Surgical History: unable to answer questions; ORDERING   SYSTEM PROVIDED HISTORY: dysarthria   TECHNOLOGIST PROVIDED HISTORY:   Has a \"code stroke\" or \"stroke alert\" been called? ->Yes   Reason for exam:->dysarthria   Decision Support Exception - unselect if not a suspected or confirmed   emergency medical condition->Emergency Medical Condition (MA)   Reason for Exam: dysarthria   Acuity: Acute   Type of Exam: Initial   Additional signs and symptoms: poor historian   Relevant Medical/Surgical History: 75ml isovue 370     FINDINGS:     CTA NECK:     AORTIC ARCH/ARCH VESSELS: No dissection or arterial injury.  No significant   stenosis of the brachiocephalic or subclavian arteries. CAROTID ARTERIES: No dissection, arterial injury, or hemodynamically   significant stenosis by NASCET criteria. VERTEBRAL ARTERIES: No dissection, arterial injury, or significant stenosis   in the right vertebral artery.  There is short-segment dissection of the left   vertebral artery V2 segment spanning the C5 vertebral body level.  No   associated stenosis. SOFT TISSUES: The lung apices are clear.  No cervical or superior mediastinal   lymphadenopathy.  The larynx and pharynx are unremarkable.  No acute   abnormality of the salivary and thyroid glands. BONES: Moderate degenerative disease at C6-C7. CTA HEAD:     ANTERIOR CIRCULATION: No significant stenosis of the intracranial internal   carotid, anterior cerebral, or middle cerebral arteries. No aneurysm. Anterior communicating artery is present. POSTERIOR CIRCULATION: No significant stenosis of the vertebral, basilar, or   posterior cerebral arteries. No aneurysm.      OTHER: No dural venous sinus thrombosis on this non-dedicated study. BRAIN: No mass effect or midline shift. No extra-axial fluid collection. The   gray-white differentiation is maintained.      Impression:       1. No hemodynamically significant stenosis or branch occlusion in the   cervical or intracranial circulation. 2. Short segment left vertebral artery V2 segment dissection spanning the C5   vertebral body level.  No associated stenosis. Findings were discussed with Melanie Topete at 3:32 pm on 5/22/2021.      CT HEAD WO CONTRAST [[id number]] Collected: 05/22/21 1459     Order Status: Completed Updated: 05/22/21 1508     Narrative:       EXAMINATION:   CT OF THE HEAD WITHOUT CONTRAST  5/22/2021 2:52 pm     TECHNIQUE:   CT of the head was performed without the administration of intravenous   contrast. Dose modulation, iterative reconstruction, and/or weight based   adjustment of the mA/kV was utilized to reduce the radiation dose to as low   as reasonably achievable. COMPARISON:   05/16/2021     HISTORY:   ORDERING SYSTEM PROVIDED HISTORY: dysarthria   TECHNOLOGIST PROVIDED HISTORY:   Has a \"code stroke\" or \"stroke alert\" been called? ->Yes   Reason for exam:->dysarthria   Decision Support Exception - unselect if not a suspected or confirmed   emergency medical condition->Emergency Medical Condition (MA)   Is the patient pregnant?->No   Reason for Exam: dysarthria   Acuity: Acute   Type of Exam: Initial   Additional signs and symptoms: unknown   Relevant Medical/Surgical History: poor historian, hx of recent craniotomy,   still has staples in head     FINDINGS:   BRAIN/VENTRICLES: Low-density extra-axial collection overlying the left   cerebral convexity laterally, subjacent to the craniotomy flap, measures up   to 0.7 cm in thickness.  There is a small amount of gas in this collection.    There is mild mass effect on the subjacent brain parenchyma with sulcal   effacement, but no significant midline shift.  Redemonstration of low attenuation within the left parietal and occipital lobes, similar in   appearance to prior study.  No new intracranial hemorrhage.  No   hydrocephalus.  The basal cisterns are patent. ORBITS: The visualized portion of the orbits demonstrate no acute abnormality. SINUSES: The visualized paranasal sinuses and mastoid air cells demonstrate   no acute abnormality. SOFT TISSUES/SKULL:  Status post left frontoparietal craniotomy.  Decrease in   left-sided scalp swelling.  Skin staples still present.      Impression:       1. Stable low-density extra-axial collection overlying the left lateral   cerebral convexity measuring 0.7 cm in thickness.  This is presumed related   to recent craniotomy.  No new intracranial hemorrhage. 2. Again seen is left parietal lobe and occipital lobe low-attenuation   suggesting subacute infarctions or postoperative edema.    Findings were discussed with Manoj Blunt at 3:05 pm on 5/22/2021.      XR CHEST PORTABLE [[id number]]      Order Status: No result            Electronically signed by ALFONSO Coleman NP on 5/22/2021 at 3:45 PM

## 2021-05-22 NOTE — ED NOTES
Patient updated on plan of care. Resting in bed with no signs of distress. Remains on cardiac monitor. Resps even and unlabored. Denies any needs. Side rails up x 2.       Hayder Lake RN  05/22/21 9998

## 2021-05-23 LAB
CHOLESTEROL: 194 MG/DL
EKG ATRIAL RATE: 109 BPM
EKG DIAGNOSIS: NORMAL
EKG P AXIS: 48 DEGREES
EKG P-R INTERVAL: 144 MS
EKG Q-T INTERVAL: 340 MS
EKG QRS DURATION: 66 MS
EKG QTC CALCULATION (BAZETT): 457 MS
EKG R AXIS: 35 DEGREES
EKG T AXIS: 49 DEGREES
EKG VENTRICULAR RATE: 109 BPM
ESTIMATED AVERAGE GLUCOSE: 100 MG/DL
HBA1C MFR BLD: 5.1 % (ref 4.2–6.3)
HCT VFR BLD CALC: 40.3 % (ref 37–47)
HDLC SERPL-MCNC: 78 MG/DL
HEMOGLOBIN: 12.9 GM/DL (ref 12.5–16)
LDL CHOLESTEROL DIRECT: 113 MG/DL
MAGNESIUM: 2.1 MG/DL (ref 1.8–2.4)
MCH RBC QN AUTO: 27.3 PG (ref 27–31)
MCHC RBC AUTO-ENTMCNC: 32 % (ref 32–36)
MCV RBC AUTO: 85.4 FL (ref 78–100)
PDW BLD-RTO: 13.6 % (ref 11.7–14.9)
PLATELET # BLD: 246 K/CU MM (ref 140–440)
PMV BLD AUTO: 9.4 FL (ref 7.5–11.1)
RBC # BLD: 4.72 M/CU MM (ref 4.2–5.4)
TRIGL SERPL-MCNC: 88 MG/DL
WBC # BLD: 6.7 K/CU MM (ref 4–10.5)

## 2021-05-23 PROCEDURE — 83721 ASSAY OF BLOOD LIPOPROTEIN: CPT

## 2021-05-23 PROCEDURE — 94761 N-INVAS EAR/PLS OXIMETRY MLT: CPT

## 2021-05-23 PROCEDURE — 93010 ELECTROCARDIOGRAM REPORT: CPT | Performed by: INTERNAL MEDICINE

## 2021-05-23 PROCEDURE — 2140000000 HC CCU INTERMEDIATE R&B

## 2021-05-23 PROCEDURE — 6370000000 HC RX 637 (ALT 250 FOR IP): Performed by: NURSE PRACTITIONER

## 2021-05-23 PROCEDURE — 80061 LIPID PANEL: CPT

## 2021-05-23 PROCEDURE — 85027 COMPLETE CBC AUTOMATED: CPT

## 2021-05-23 PROCEDURE — 83036 HEMOGLOBIN GLYCOSYLATED A1C: CPT

## 2021-05-23 PROCEDURE — 2580000003 HC RX 258: Performed by: NURSE PRACTITIONER

## 2021-05-23 PROCEDURE — 36415 COLL VENOUS BLD VENIPUNCTURE: CPT

## 2021-05-23 PROCEDURE — 83735 ASSAY OF MAGNESIUM: CPT

## 2021-05-23 RX ADMIN — AMANTADINE HYDROCHLORIDE 100 MG: 100 CAPSULE, LIQUID FILLED ORAL at 08:19

## 2021-05-23 RX ADMIN — LEVETIRACETAM 1500 MG: 500 TABLET, FILM COATED ORAL at 21:27

## 2021-05-23 RX ADMIN — LEVETIRACETAM 1500 MG: 500 TABLET, FILM COATED ORAL at 08:19

## 2021-05-23 RX ADMIN — TAMSULOSIN HYDROCHLORIDE 0.4 MG: 0.4 CAPSULE ORAL at 08:19

## 2021-05-23 RX ADMIN — PANTOPRAZOLE SODIUM 40 MG: 40 TABLET, DELAYED RELEASE ORAL at 06:28

## 2021-05-23 RX ADMIN — FLUOXETINE 20 MG: 20 CAPSULE ORAL at 08:19

## 2021-05-23 RX ADMIN — DONEPEZIL HYDROCHLORIDE 10 MG: 10 TABLET, FILM COATED ORAL at 21:27

## 2021-05-23 RX ADMIN — AMANTADINE HYDROCHLORIDE 100 MG: 100 CAPSULE, LIQUID FILLED ORAL at 21:27

## 2021-05-23 RX ADMIN — ROSUVASTATIN 40 MG: 40 TABLET, FILM COATED ORAL at 21:27

## 2021-05-23 RX ADMIN — SODIUM CHLORIDE, PRESERVATIVE FREE 10 ML: 5 INJECTION INTRAVENOUS at 21:27

## 2021-05-23 RX ADMIN — TAMSULOSIN HYDROCHLORIDE 0.4 MG: 0.4 CAPSULE ORAL at 21:27

## 2021-05-23 RX ADMIN — AMITRIPTYLINE HYDROCHLORIDE 25 MG: 25 TABLET, FILM COATED ORAL at 21:27

## 2021-05-23 ASSESSMENT — PAIN SCALES - GENERAL
PAINLEVEL_OUTOF10: 0
PAINLEVEL_OUTOF10: 0

## 2021-05-23 NOTE — PROGRESS NOTES
Skin assessment completed with RN, pt has a surgical scar with stapled on left side of head. Small bruises noted bue and ble.
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
## 2021-05-23 NOTE — CONSULTS
HPI:  50 y.o. female admitted for dysarthria. She underwent craniectomy in February 2020 for close head injury. She did underwent cranioplasty approximately 11 days ago in [place]. I was consulted to review her head CT without contrast as well as CT angiogram showing hospital vertebral artery dissection. Physical exam:  Vitals:    05/23/21 0324 05/23/21 0745 05/23/21 0806 05/23/21 1100   BP:  118/84  102/78   Pulse: 83   91   Resp:   11 13   Temp:  98.4 °F (36.9 °C)     TempSrc:  Oral  Oral   SpO2:  96% 95%    Weight:       Height:         Awake, alert; oriented to person, city and year. No acute distress. Cranial nerves II to XII intact bilaterally. Left scalp incision clean, dry and intact with staples. Motor 5/5 bilateral upper and lower extremities. Sensation intact to light touch. Deep tendon reflexes normal and symmetric. Fluent speech with good comprehension. She does have difficulty recalling the events related to her head injury and subsequent care. Lab Results   Component Value Date    WBC 6.7 05/23/2021    HGB 12.9 05/23/2021    HCT 40.3 05/23/2021    MCV 85.4 05/23/2021     05/23/2021     Lab Results   Component Value Date     05/22/2021    K 4.1 05/22/2021    CL 97 05/22/2021    CO2 27 05/22/2021    BUN 9 05/22/2021    CREATININE 0.7 05/22/2021    GLUCOSE 115 05/22/2021    CALCIUM 8.9 05/22/2021      No results found for: APTT  Lab Results   Component Value Date    INR 1.07 05/22/2021    PROTIME 12.9 05/22/2021       Imaging:  I have personally reviewed the images and radiology reports from head CT without contrast on 5/22/2021; and CTA head and neck on 5/22/2021. Postoperative changes are seen on the left consistent with recent cranioplasty surgery. Small extra-axial hemorrhage is seen which is not uncommon for this type of surgery. Possible dissection of the left V2 segment is seen. Impression:  History of closed head injury. History of craniectomy.   Recent cranioplasty. Rule out left vertebral artery dissection. Recommendations:  · I cannot exclude that the patient's dysarthria was secondary to a posterior circulation TIA. Given the possibility of vertebral artery dissection, I do think it is necessary to obtain a conventional angiogram at this time. This will allow to assess the left vertebral artery and determine if it truly is a dissection which requires antiplatelet treatment. If necessary, she should be transferred to another facility to have the conventional angiogram performed. · Okay to start antiplatelet treatment from my standpoint. Would be at increased risk of bleeding. However, she is almost 2 weeks out from the cranioplasty surgery and the benefit would outweigh the risk of antiplatelet therapy. · Agree with brain MRI with DWI sequence. This will help to determine if there was a posterior circulation stroke. Artemio Mueller MD, [address], [facility]  Board Certified Neurosurgeon  Minimally Invasive Spine Surgeon  President of [address] [place] [address]..    Phone: Milton Rubens ([phone])  Fax: [phone]  Website: [employer]

## 2021-05-23 NOTE — PROGRESS NOTES
Hospitalist Progress Note      Name:  Yadira Stauffer /Age/Sex: 1965  (50 y.o. female)   MRN & CSN:  [id number] & [id number] Admission Date/Time: 2021  2:41 PM   [facility]  [facility]- PCP: No primary care provider on file. Hospital Day: 2    Assessment and Plan:   Diana Kennedy is a 50 y.o.  female  who presents with stroke-like symptoms     1) Dysarthria concerning for TIA  -CT head:  Stable low-density extra-axial collection overlying the left lateral cerebral convexity which is present to be related to recent craniotomy. No new intracranial hemorrhage  -CTA head and neck: No significant stenosis or occlusion. 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 (Reportedly present since  and more likely fenestration according to patient's neurosurgeon of [address] where she had her surgery done)  -MRI brain ordered  -Neurosurgery on board; awaiting clearance for aspirin due to recent bone flap about 10 days ago  -Neurology on board; continue statin       Chronic medical conditions, medication resumed unless contraindicated  - TBI w/ intracranial bleeding hx- 2020;  Tx at [address]; had bone flap replaced ~ 10 days ago  - Seizure hx- onset prior to TBI; continue keppra 1500 bid  - Headache hx- onset prior to TBI  - Depression- cont prozac  - GERD- cont ppi    Diet DIET GENERAL;   DVT Prophylaxis [] Lovenox, []  Heparin, [] SCDs, [] Ambulation   GI Prophylaxis [] PPI,  [] H2 Blocker,  [] Carafate,  [] Diet/Tube Feeds   Code Status Full Code   Disposition Home   MDM      History of Present Illness:       Patient was seen and examined  Denied any further speech difficulty  No focal deficits reported  No acute events overnight    Ten point ROS reviewed negative, unless as noted above    Objective:   No intake or output data in the 24 hours ending 21 0953   Vitals:   Vitals:    21 0806   BP:    Pulse: Resp: 11   Temp:    SpO2: 95%     Physical Exam:   GEN Awake female, sitting upright in bed in no apparent distress. Appears given age. EYES Pupils are equally round. No scleral erythema, discharge, or conjunctivitis. HENT Mucous membranes are moist. Oral pharynx without exudates, no evidence of thrush. NECK Supple, no apparent thyromegaly or masses. RESP Clear to auscultation, no wheezes, rales or rhonchi. Symmetric chest movement while on room air. CARDIO/VASC S1/S2 auscultated. Regular rate without appreciable murmurs, rubs, or gallops. No JVD or carotid bruits. Peripheral pulses equal bilaterally and palpable. No peripheral edema. GI Abdomen is soft without significant tenderness, masses, or guarding. Bowel sounds are normoactive. Rectal exam deferred.  No costovertebral angle tenderness. Begum catheter is not present. HEME/LYMPH No palpable cervical lymphadenopathy and no hepatosplenomegaly. No petechiae or ecchymoses. MSK No gross joint deformities. SKIN Normal coloration, warm, dry. NEURO Cranial nerves appear grossly intact, normal speech, no lateralizing weakness. PSYCH Awake, alert, oriented x 4. Affect appropriate.     Medications:   Medications:    sodium chloride flush  5-40 mL Intravenous 2 times per day    rosuvastatin  40 mg Oral Nightly    amantadine  100 mg Oral BID    amitriptyline  25 mg Oral Nightly    donepezil  10 mg Oral Nightly    FLUoxetine  20 mg Oral Daily    levETIRAcetam  1,500 mg Oral BID    tamsulosin  0.4 mg Oral BID    pantoprazole  40 mg Oral QAM AC      Infusions:    sodium chloride       PRN Meds: sodium chloride flush, 5-40 mL, PRN  sodium chloride, 25 mL, PRN  promethazine, 12.5 mg, Q6H PRN   Or  ondansetron, 4 mg, Q6H PRN  polyethylene glycol, 17 g, Daily PRN  melatonin, 10 mg, Nightly PRN          Electronically signed by Deneen Gould MD on 5/23/2021 at 9:53 AM

## 2021-05-24 ENCOUNTER — APPOINTMENT (OUTPATIENT)
Dept: MRI IMAGING | Age: 56
DRG: 069 | End: 2021-05-24
Payer: MEDICAID

## 2021-05-24 VITALS
OXYGEN SATURATION: 98 % | SYSTOLIC BLOOD PRESSURE: 119 MMHG | RESPIRATION RATE: 12 BRPM | TEMPERATURE: 97.7 F | DIASTOLIC BLOOD PRESSURE: 87 MMHG | HEART RATE: 101 BPM | HEIGHT: 64 IN | WEIGHT: 136.9 LBS | BODY MASS INDEX: 23.37 KG/M2

## 2021-05-24 LAB — TSH HIGH SENSITIVITY: 1.4 UIU/ML (ref 0.27–4.2)

## 2021-05-24 PROCEDURE — 92610 EVALUATE SWALLOWING FUNCTION: CPT | Performed by: SPEECH-LANGUAGE PATHOLOGIST

## 2021-05-24 PROCEDURE — 70551 MRI BRAIN STEM W/O DYE: CPT

## 2021-05-24 PROCEDURE — 84443 ASSAY THYROID STIM HORMONE: CPT

## 2021-05-24 PROCEDURE — 94761 N-INVAS EAR/PLS OXIMETRY MLT: CPT

## 2021-05-24 PROCEDURE — 6370000000 HC RX 637 (ALT 250 FOR IP): Performed by: NURSE PRACTITIONER

## 2021-05-24 RX ORDER — ASPIRIN 81 MG/1
81 TABLET ORAL DAILY
Qty: 30 TABLET | Refills: 0 | Status: SHIPPED | OUTPATIENT
Start: 2021-05-24

## 2021-05-24 RX ORDER — ROSUVASTATIN CALCIUM 40 MG/1
40 TABLET, COATED ORAL NIGHTLY
Qty: 30 TABLET | Refills: 0 | Status: SHIPPED | OUTPATIENT
Start: 2021-05-24

## 2021-05-24 RX ADMIN — PANTOPRAZOLE SODIUM 40 MG: 40 TABLET, DELAYED RELEASE ORAL at 05:27

## 2021-05-24 RX ADMIN — FLUOXETINE 20 MG: 20 CAPSULE ORAL at 09:16

## 2021-05-24 RX ADMIN — LEVETIRACETAM 1500 MG: 500 TABLET, FILM COATED ORAL at 09:16

## 2021-05-24 RX ADMIN — AMANTADINE HYDROCHLORIDE 100 MG: 100 CAPSULE, LIQUID FILLED ORAL at 09:17

## 2021-05-24 RX ADMIN — TAMSULOSIN HYDROCHLORIDE 0.4 MG: 0.4 CAPSULE ORAL at 09:17

## 2021-05-24 NOTE — PLAN OF CARE
Problem: HEMODYNAMIC STATUS  Goal: Patient has stable vital signs and fluid balance  5/23/2021 2304 by Jennifer Harry LPN  Outcome: Ongoing  5/23/2021 1820 by Mateus Foster RN  Outcome: Ongoing     Problem: ACTIVITY INTOLERANCE/IMPAIRED MOBILITY  Goal: Mobility/activity is maintained at optimum level for patient  5/23/2021 2304 by Jennifer Harry LPN  Outcome: Ongoing  5/23/2021 1820 by Mateus Foster RN  Outcome: Ongoing     Problem: COMMUNICATION IMPAIRMENT  Goal: Ability to express needs and understand communication  5/23/2021 2304 by Jennifer Harry LPN  Outcome: Ongoing  5/23/2021 1820 by Mateus Foster RN  Outcome: Ongoing

## 2021-05-24 NOTE — DISCHARGE SUMMARY
Discharge Summary    Name:  Robby Henry /Age/Sex: 1965  (50 y.o. female)   MRN & CSN:  [id number] & [id number] Admission Date/Time: 2021  2:41 PM   Attending:  Maria T Tavera MD Discharging Physician: Bubba Cantu MD     Hospital Course:   Russ Light a 50 y. o.  female  who presents with stroke-like symptoms    Patient was seen and examined  Denied any worsening speech abnormality  No focal weakness reported  No acute events overnight     Assessment and plan  1) Dysarthria concerning for TIA  -CT head:  Stable low-density extra-axial collection overlying the left lateral cerebral convexity which is present to be related to recent craniotomy. No new intracranial hemorrhage  -CTA head and neck: No significant stenosis or occlusion. 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 (Reportedly present since  and more likely fenestration according [facility])  -MRI brain: No acute intracranial abnormality  -Neurosurgery on board; appreciate recommendation. Patient to follow up with her NS as OP  -Neurology on board; follow up as OP      Chronic medical conditions, medication resumed unless contraindicated  - TBI w/ intracranial bleeding hx- 2020; Tx [address]; had bone flap replaced ~ 10 days ago  - Seizure hx- onset prior to TBI; continue keppra 1500 bid  - Headache hx- onset prior to TBI  - Depression- cont prozac  - GERD- cont ppi         The patient expressed appropriate understanding of and agreement with the discharge recommendations, medications, and plan.      Consults this admission:  IP CONSULT TO PHARMACY  PHARMACY TO CHANGE BASE FLUIDS  IP CONSULT TO HOSPITALIST  IP CONSULT TO NEUROSURGERY  IP CONSULT TO NEUROLOGY  IP CONSULT TO [address] SURGERY    Discharge Instruction:   Follow up appointments: Neurology in 2 weeks  Primary care physician:  within 2 weeks    Diet:  regular diet   Activity: activity as tolerated  Disposition: Discharged to:   [x]Home, []HHC, []SNF, []Acute Rehab, []Hospice   Condition on discharge: Stable    Discharge Medications:      Aleksandr [facility] [address] Medication Instructions TANIA:[id number]    Printed on:05/24/21 8451   Medication Information                      Amantadine (SYMMETREL) 100 MG TABS tablet  Take 100 mg by mouth 2 times daily             amitriptyline (ELAVIL) 25 MG tablet  Take 25 mg by mouth nightly             donepezil (ARICEPT) 10 MG tablet  Take 10 mg by mouth nightly             esomeprazole Magnesium (NEXIUM) 40 MG PACK  Take 40 mg by mouth daily             FLUoxetine (PROZAC) 20 MG capsule  Take 20 mg by mouth daily             levETIRAcetam (KEPPRA) 750 MG tablet  Take 1,500 mg by mouth 2 times daily             melatonin 3 MG TABS tablet  Take 10 mg by mouth nightly as needed             tamsulosin (FLOMAX) 0.4 MG capsule  Take 0.4 mg by mouth 2 times daily                 Objective Findings at Discharge:   /87   Pulse 101   Temp 97.7 °F (36.5 °C) (Oral)   Resp 12   Ht 5' 4\" (1.626 m)   Wt 136 lb 14.4 oz (62.1 kg)   SpO2 98%   BMI 23.50 kg/m²            PHYSICAL EXAM   GEN Awake female, sitting upright in bed in no apparent distress. Appears given age. EYES Pupils are equally round. No scleral erythema, discharge, or conjunctivitis. HENT Mucous membranes are moist. Oral pharynx without exudates, no evidence of thrush. NECK Supple, no apparent thyromegaly or masses. RESP Clear to auscultation, no wheezes, rales or rhonchi. Symmetric chest movement while on room air. CARDIO/VASC S1/S2 auscultated. Regular rate without appreciable murmurs, rubs, or gallops. No JVD or carotid bruits. Peripheral pulses equal bilaterally and palpable. No peripheral edema. GI Abdomen is soft without significant tenderness, masses, or guarding. Bowel sounds are normoactive. Rectal exam deferred.  No costovertebral angle tenderness. Begum catheter is not present. HEME/LYMPH No palpable cervical lymphadenopathy and no hepatosplenomegaly. No petechiae or ecchymoses. MSK No gross joint deformities. SKIN Normal coloration, warm, dry. NEURO Cranial nerves appear grossly intact, normal speech, no lateralizing weakness. PSYCH Awake, alert, oriented x 4. Affect appropriate.     BMP/CBC  Recent Labs     05/22/21  1500 05/23/21  0318   *  --    K 4.1  --    CL 97*  --    CO2 27  --    BUN 9  --    CREATININE 0.7  --    WBC 7.7 6.7   HCT 34.4* 40.3    246       IMAGING:  As above    Discharge Time of 25 minutes    Electronically signed by Sahra Gamez MD on 5/24/2021 at 10:57 AM
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
